# Patient Record
Sex: FEMALE | Race: BLACK OR AFRICAN AMERICAN | Employment: FULL TIME | URBAN - METROPOLITAN AREA
[De-identification: names, ages, dates, MRNs, and addresses within clinical notes are randomized per-mention and may not be internally consistent; named-entity substitution may affect disease eponyms.]

---

## 2017-07-25 ENCOUNTER — HOSPITAL ENCOUNTER (EMERGENCY)
Age: 37
Discharge: HOME | End: 2017-07-25
Attending: EMERGENCY MEDICINE
Payer: COMMERCIAL

## 2017-07-25 VITALS
DIASTOLIC BLOOD PRESSURE: 39 MMHG | HEIGHT: 67 IN | RESPIRATION RATE: 17 BRPM | SYSTOLIC BLOOD PRESSURE: 109 MMHG | OXYGEN SATURATION: 100 % | HEART RATE: 81 BPM | WEIGHT: 175.27 LBS | BODY MASS INDEX: 27.51 KG/M2

## 2017-07-25 DIAGNOSIS — H72.92 RUPTURED TYMPANIC MEMBRANE, LEFT: ICD-10-CM

## 2017-07-25 DIAGNOSIS — H66.92 ACUTE LEFT OTITIS MEDIA: Primary | ICD-10-CM

## 2017-07-25 PROCEDURE — 99282 EMERGENCY DEPT VISIT SF MDM: CPT

## 2017-07-25 RX ORDER — CEFDINIR 300 MG/1
300 CAPSULE ORAL 2 TIMES DAILY
Qty: 20 CAP | Refills: 0 | Status: SHIPPED | OUTPATIENT
Start: 2017-07-25 | End: 2017-08-04

## 2017-07-26 NOTE — DISCHARGE INSTRUCTIONS
Ear Infection (Otitis Media): Care Instructions  Your Care Instructions    An ear infection may start with a cold and affect the middle ear (otitis media). It can hurt a lot. Most ear infections clear up on their own in a couple of days. Most often you will not need antibiotics. This is because many ear infections are caused by a virus. Antibiotics don't work against a virus. Regular doses of pain medicines are the best way to reduce your fever and help you feel better. Follow-up care is a key part of your treatment and safety. Be sure to make and go to all appointments, and call your doctor if you are having problems. It's also a good idea to know your test results and keep a list of the medicines you take. How can you care for yourself at home? · Take pain medicines exactly as directed. ¨ If the doctor gave you a prescription medicine for pain, take it as prescribed. ¨ If you are not taking a prescription pain medicine, take an over-the-counter medicine, such as acetaminophen (Tylenol), ibuprofen (Advil, Motrin), or naproxen (Aleve). Read and follow all instructions on the label. ¨ Do not take two or more pain medicines at the same time unless the doctor told you to. Many pain medicines have acetaminophen, which is Tylenol. Too much acetaminophen (Tylenol) can be harmful. · Plan to take a full dose of pain reliever before bedtime. Getting enough sleep will help you get better. · Try a warm, moist washcloth on the ear. It may help relieve pain. · If your doctor prescribed antibiotics, take them as directed. Do not stop taking them just because you feel better. You need to take the full course of antibiotics. When should you call for help? Call your doctor now or seek immediate medical care if:  · You have new or increasing ear pain. · You have new or increasing pus or blood draining from your ear. · You have a fever with a stiff neck or a severe headache.   Watch closely for changes in your health, and be sure to contact your doctor if:  · You have new or worse symptoms. · You are not getting better after taking an antibiotic for 2 days. Where can you learn more? Go to http://sherrell-don.info/. Enter A173 in the search box to learn more about \"Ear Infection (Otitis Media): Care Instructions. \"  Current as of: May 4, 2017  Content Version: 11.3  © 9284-6191 [employer]. Care instructions adapted under license by [employer] (which disclaims liability or warranty for this information). If you have questions about a medical condition or this instruction, always ask your healthcare professional. [place] [address] any warranty or liability for your use of this information. Perforated Eardrum: Care Instructions  Your Care Instructions    A tear or hole in the membrane of the middle ear is called a perforated or ruptured eardrum. This can happen if an infection builds up inside the ear or if the eardrum gets injured. You may find it hard to hear out of that ear or may hear a buzzing sound. You may have an earache or have fluids that drain from the ear. Your eardrum should heal on its own in a few weeks, and you should hear normally then. If you have an infection, your doctor may prescribe antibiotics. You may need pain relief medicine for your earache. Your doctor will check to see if your eardrum has healed. If not, you may need surgery to repair the eardrum. Follow-up care is a key part of your treatment and safety. Be sure to make and go to all appointments, and call your doctor if you are having problems. It's also a good idea to know your test results and keep a list of the medicines you take. How can you care for yourself at home? · If your doctor prescribed antibiotics, take them as directed. Do not stop taking them just because you feel better. You need to take the full course of antibiotics.   · Take an over-the-counter pain medicine, such as acetaminophen (Tylenol), ibuprofen (Advil, Motrin), or naproxen (Aleve), as needed. Read and follow all instructions on the label. · Do not take two or more pain medicines at the same time unless the doctor told you to. Many pain medicines have acetaminophen, which is Tylenol. Too much acetaminophen (Tylenol) can be harmful. · To ease pain, put a warm washcloth or a heating pad set on low on your ear. You may have some drainage from the ear. · Be careful when taking over-the-counter cold or flu medicines and Tylenol at the same time. Many of these medicines have acetaminophen, which is Tylenol. Read the labels to make sure that you are not taking more than the recommended dose. Too much Tylenol can be harmful. · Keep your ears dry. ¨ Take baths until your doctor says you can take showers again. ¨ When you wash your hair, use cotton lightly coated with petroleum jelly as an earplug. Do not use plastic earplugs. ¨ Do not swim until your doctor says you can. ¨ If you get water in your ears, turn your head to each side and pull the earlobe in different directions. This will help the water run out. If your ears are still wet, use a hair dryer set on the lowest heat. Hold the dryer several inches from your ear. · Do not put anything into your ear canal. For example, do not use a cotton swab to clean the inside of your ear. It can damage your ear. If you think you have something inside your ear, ask your doctor to check it. When should you call for help? Call your doctor now or seek immediate medical care if:  · You have signs of infection, such as:  ¨ Increased pain, swelling, warmth, or redness. ¨ Pus draining from the ear. ¨ A fever. Watch closely for changes in your health, and be sure to contact your doctor if:  · You have changes in hearing. · You do not get better as expected. Where can you learn more? Go to http://renzo.info/.   Enter Z172 in the search box to learn more about \"Perforated Eardrum: Care Instructions. \"  Current as of: March 20, 2017  Content Version: 11.3  © 8704-8912 [employer]. Care instructions adapted under license by [employer] (which disclaims liability or warranty for this information). If you have questions about a medical condition or this instruction, always ask your healthcare professional. [address] any warranty or liability for your use of this information.

## 2017-07-26 NOTE — ED NOTES
MICHAEL instructions and education provided by Marciano Briggs. Pt. Nina [facility] palmer. Pt. Stable at [place].

## 2017-07-26 NOTE — ED PROVIDER NOTES
HPI Comments: Asaf Yee is a 39 y.o. female with PMhx significant for migraines who presents ambulatory with her  to the ED with cc of left sided ear pain which began 10 days ago. Pt states that the pain began after she was at the 's when some water got into her ear. The pain is exacerbated by laying down. Pt is also 28 weeks pregnant (Silverio Mccurdy). She is taking prenatal vitamins and denies any pregnancy complications. She used to take medication for her migraines but stopped after she became pregnant. Pt notes + fetal movement today. Pt specifically denies fevers, congestion, ST, coughing, sinus pressure, vaginal bleeding, LOF, cramping, and abdominal pain. Social Hx: - Tobacco, - EtOH, - Illicit Drugs    PCP: None    OB-GYN: Ashley Jeong MD     There are no other complaints, changes or physical findings at this time. The history is provided by the patient. No  was used. Past Medical History:   Diagnosis Date    Depression     Headache(784.0)     Migraine     Normal vaginal delivery 2000, 2003       No past surgical history on file. Family History:   Problem Relation Age of Onset    High Cholesterol Father     High Cholesterol Maternal Aunt     Other Maternal Grandmother      scleraderma    Cancer Maternal Grandfather     Cancer Paternal Grandmother     Cancer Paternal Grandfather        Social History     Social History    Marital status: SINGLE     Spouse name: N/A    Number of children: N/A    Years of education: N/A     Occupational History    Not on file.      Social History Main Topics    Smoking status: Current Every Day Smoker     Packs/day: 0.50    Smokeless tobacco: Never Used    Alcohol use 1.2 oz/week     2 Glasses of wine per week      Comment: occassionally    Drug use: No    Sexual activity: Yes     Partners: Male     Birth control/ protection: Condom      Comment:      Other Topics Concern    Not on file Social History Narrative         ALLERGIES: Review of patient's allergies indicates no known allergies. Review of Systems   Constitutional: Negative. Negative for chills and fever. HENT: Positive for ear pain (L). Negative for congestion, rhinorrhea, sinus pressure and sore throat.         + jaw pain   Eyes: Negative. Negative for visual disturbance. Respiratory: Negative. Negative for cough, chest tightness, shortness of breath and wheezing. Cardiovascular: Negative. Negative for chest pain and palpitations. Gastrointestinal: Negative. Negative for abdominal pain, constipation, diarrhea, nausea and vomiting. Genitourinary: Negative. Negative for dysuria, hematuria and vaginal bleeding. Musculoskeletal: Negative. Negative for arthralgias and myalgias. Skin: Negative. Negative for rash. Allergic/Immunologic: Negative. Negative for environmental allergies and food allergies. Neurological: Negative. Negative for headaches. Psychiatric/Behavioral: Negative. Negative for suicidal ideas. Vitals:    07/25/17 2213   BP: (!) 109/39   Pulse: 81   Resp: 17   SpO2: 100%   Weight: 79.5 kg (175 lb 4.3 oz)   Height: 5' 7\" (1.702 m)            Physical Exam   Constitutional: She is oriented to person, place, and time. She appears well-developed and well-nourished. No distress. Pt is an AAF, awake and alert in NAD. HENT:   Head: Normocephalic and atraumatic. Right Ear: Tympanic membrane, external ear and ear canal normal.   Left Ear: External ear and ear canal normal. No drainage. Nose: Nose normal.   Mouth/Throat: Uvula is midline, oropharynx is clear and moist and mucous membranes are normal. No oropharyngeal exudate, posterior oropharyngeal edema or posterior oropharyngeal erythema. Small rupture at 10 o'clock of left TM. No drainage noted. Eyes: Conjunctivae and EOM are normal. Pupils are equal, round, and reactive to light. Right eye exhibits no discharge.  Left eye exhibits no discharge. Neck: Normal range of motion. Cardiovascular: Normal rate and normal heart sounds. Pulmonary/Chest: Effort normal and breath sounds normal. No respiratory distress. She has no wheezes. She has no rales. She exhibits no tenderness. Abdominal: Soft. Bowel sounds are normal. She exhibits distension (gravid uterus). There is no tenderness. There is no guarding. No CVA tenderness b/l. Genitourinary:   Genitourinary Comments: Gravid uterus    Musculoskeletal: Normal range of motion. She exhibits no edema or tenderness. Neurological: She is alert and oriented to person, place, and time. Coordination normal.   No focal neuro deficits. Skin: Skin is warm and dry. No rash noted. She is not diaphoretic. No erythema. No pallor. Psychiatric: She has a normal mood and affect. Her behavior is normal.   Vitals reviewed. MDM  Number of Diagnoses or Management Options  Acute left otitis media:   Ruptured tympanic membrane, left:   Diagnosis management comments: DDx: Otitis media, Otitis externa, Ruptured TM       Amount and/or Complexity of Data Reviewed  Review and summarize past medical records: yes    Patient Progress  Patient progress: stable    ED Course       Procedures    MEDICATIONS GIVEN:  Medications - No data to display    IMPRESSION:  1. Acute left otitis media    2. Ruptured tympanic membrane, left        PLAN:  1. Current Discharge Medication List      START taking these medications    Details   cefdinir (OMNICEF) 300 mg capsule Take 1 Cap by mouth two (2) times a day for 10 days. Qty: 20 Cap, Refills: 0           2.    Follow-up Information     Follow up With Details Comments Contact Info    Jeanie Lainez MD Schedule an appointment as soon as possible for a visit in 2 days  [address] [facility]      Tran Borrego MD Schedule an appointment as soon as possible for a visit in 4 days  [address] [place] [address]  [phone]      \[facility] [place] [facility]\"" EMERGENCY DEPT  As needed or, If symptoms worsen [address] [facility]  [phone]        Return to ED if worse     Discharge Note:  10:57 PM  The patient has been re-evaluated and is ready for discharge. Reviewed available results with patient. Counseled patient on diagnosis and care plan. Patient has expressed understanding, and all questions have been answered. Patient agrees with plan and agrees to follow up as recommended, or return to the ED if their symptoms worsen. Discharge instructions have been provided and explained to the patient, along with reasons to return to the ED. Attestation: This note is prepared by Clare Agrawal, acting as Scribe for FELICIA Gonsalez PA-C: The scribe's documentation has been prepared under my direction and personally reviewed by me in its entirety. I confirm that the note above accurately reflects all work, treatment, procedures, and medical decision making performed by me. This note will not be viewable in [address]

## 2018-11-28 ENCOUNTER — TELEPHONE (OUTPATIENT)
Dept: NEUROLOGY | Age: 38
End: 2018-11-28

## 2018-11-28 NOTE — TELEPHONE ENCOUNTER
Pt has been to [facility] twice in a week for her migraines. She is scheduled for a f/u on 1/4 but would like to know if she can be seen sooner.  Please call back

## 2018-11-28 NOTE — TELEPHONE ENCOUNTER
Spoke with patient that there is no opening at this time but she is welcome to call and see if there has been any cancellations. She verbalized understanding.

## 2021-11-22 ENCOUNTER — HOSPITAL ENCOUNTER (EMERGENCY)
Age: 41
Discharge: HOME | End: 2021-11-22
Attending: EMERGENCY MEDICINE
Payer: COMMERCIAL

## 2021-11-22 ENCOUNTER — APPOINTMENT (OUTPATIENT)
Dept: GENERAL RADIOLOGY | Age: 41
End: 2021-11-22
Attending: EMERGENCY MEDICINE
Payer: COMMERCIAL

## 2021-11-22 VITALS
TEMPERATURE: 98.7 F | HEIGHT: 67 IN | HEART RATE: 82 BPM | WEIGHT: 139 LBS | DIASTOLIC BLOOD PRESSURE: 73 MMHG | SYSTOLIC BLOOD PRESSURE: 119 MMHG | BODY MASS INDEX: 21.82 KG/M2 | OXYGEN SATURATION: 98 % | RESPIRATION RATE: 19 BRPM

## 2021-11-22 DIAGNOSIS — S93.411A SPRAIN OF CALCANEOFIBULAR LIGAMENT OF RIGHT ANKLE, INITIAL ENCOUNTER: Primary | ICD-10-CM

## 2021-11-22 DIAGNOSIS — Z72.0 TOBACCO ABUSE: ICD-10-CM

## 2021-11-22 PROCEDURE — 74011250637 HC RX REV CODE- 250/637: Performed by: PHYSICIAN ASSISTANT

## 2021-11-22 PROCEDURE — 99283 EMERGENCY DEPT VISIT LOW MDM: CPT

## 2021-11-22 PROCEDURE — 73610 X-RAY EXAM OF ANKLE: CPT

## 2021-11-22 RX ORDER — ACETAMINOPHEN 500 MG
1000 TABLET ORAL
Status: COMPLETED | OUTPATIENT
Start: 2021-11-22 | End: 2021-11-22

## 2021-11-22 RX ORDER — ACETAMINOPHEN 500 MG
1000 TABLET ORAL
Qty: 20 TABLET | Refills: 0 | Status: SHIPPED | OUTPATIENT
Start: 2021-11-22

## 2021-11-22 RX ORDER — IBUPROFEN 800 MG/1
800 TABLET ORAL
Qty: 20 TABLET | Refills: 0 | Status: SHIPPED | OUTPATIENT
Start: 2021-11-22 | End: 2021-11-29

## 2021-11-22 RX ADMIN — ACETAMINOPHEN 1000 MG: 500 TABLET ORAL at 11:35

## 2021-11-22 NOTE — ED NOTES
Discharge instructions were given to the patient by Adrian Norris RN. The patient left the Emergency Department ambulatory, alert and oriented and in no acute distress with 2 prescriptions. The patient was encouraged to call or return to the ED for worsening issues or problems and was encouraged to schedule a follow up appointment for continuing care. The patient verbalized understanding of discharge instructions and prescriptions, all questions were answered. The patient has no further concerns at this time.

## 2021-11-22 NOTE — ED PROVIDER NOTES
EMERGENCY DEPARTMENT HISTORY AND PHYSICAL EXAM      Date: 11/22/2021  Patient Name: Jeffie Hodgkin    History of Presenting Illness     Chief Complaint   Patient presents with    Ankle Injury     History Provided By: Patient    HPI: Jeffie Hodgkin, 36 y.o. female with medical history significant for tobacco abuse who presents via self to the ED with cc of acute moderate aching right lateral ankle pain with swelling and bruising X 2 days secondary to ground-level fall while drinking alcohol yesterday. Denies any other injuries or trauma. States that she was wearing high heels. No medications or alleviating factors. No fever, chills, nausea, vomiting, focal weakness. She does endorse some numbness to the area. PCP: None    There are no other complaints, changes, or physical findings at this time. No current facility-administered medications on file prior to encounter. Current Outpatient Medications on File Prior to Encounter   Medication Sig Dispense Refill    ZOLMitriptan (ZOMIG) 5 mg tablet Take 1 Tab by mouth as needed for Migraine. 9 Tab 1    riboflavin, vitamin B2, 100 mg tablet Take 1 Tab by mouth daily. 30 Tab 1    amitriptyline (ELAVIL) 25 mg tablet Take 1 Tab by mouth nightly. 30 Tab 1    diclofenac potassium (CATAFLAM) 50 mg tablet Take 1 Tab by mouth three (3) times daily as needed for Pain. Indications: MIGRAINE 50 Tab 6    lamoTRIgine (LAMICTAL XR) 50 mg tr24 ER tablet Take 1 Tab by mouth daily. 30 Tab 6     Past History     Past Medical History:  Past Medical History:   Diagnosis Date    Depression     Headache(784.0)     Migraine     Normal vaginal delivery 2000, 2003     Past Surgical History:  No past surgical history on file.   Family History:  Family History   Problem Relation Age of Onset    High Cholesterol Father     High Cholesterol Maternal Aunt     Other Maternal Grandmother         scleraderma    Cancer Maternal Grandfather     Cancer Paternal Grandmother  Cancer Paternal Grandfather      Social History:  Social History     Tobacco Use    Smoking status: Current Every Day Smoker     Packs/day: 0.50    Smokeless tobacco: Never Used   Substance Use Topics    Alcohol use: Yes     Alcohol/week: 2.0 standard drinks     Types: 2 Glasses of wine per week     Comment: occassionally    Drug use: No     Allergies:  No Known Allergies  Review of Systems   Review of Systems   Constitutional: Negative for activity change, appetite change, chills, diaphoresis, fatigue and fever. HENT: Negative. Eyes: Negative. Negative for pain and visual disturbance. Respiratory: Negative. Negative for cough and shortness of breath. Cardiovascular: Negative. Negative for chest pain and leg swelling. Gastrointestinal: Negative. Negative for abdominal pain, diarrhea, nausea and vomiting. Genitourinary: Negative. Musculoskeletal: Positive for arthralgias, gait problem and joint swelling. Negative for back pain, myalgias, neck pain and neck stiffness. Skin: Negative. Negative for color change, pallor and wound. Neurological: Negative for weakness and numbness. Psychiatric/Behavioral: Negative. Negative for confusion. Physical Exam   Physical Exam  Vitals and nursing note reviewed. Constitutional:       General: She is not in acute distress. Appearance: Normal appearance. She is well-developed. She is not ill-appearing, toxic-appearing or diaphoretic. HENT:      Head: Normocephalic and atraumatic. Right Ear: Hearing and external ear normal.      Left Ear: Hearing and external ear normal.      Nose: Nose normal.   Eyes:      Conjunctiva/sclera: Conjunctivae normal.      Pupils: Pupils are equal, round, and reactive to light. Cardiovascular:      Pulses:           Dorsalis pedis pulses are 2+ on the right side and 2+ on the left side. Posterior tibial pulses are 2+ on the right side and 2+ on the left side.    Pulmonary:      Effort: Pulmonary effort is normal. No respiratory distress. Musculoskeletal:         General: Normal range of motion. Cervical back: Normal range of motion. Right knee: Normal.      Right lower leg: Normal.      Left lower leg: Normal.      Right ankle: Swelling and ecchymosis present. No deformity or lacerations. Tenderness present over the lateral malleolus, ATF ligament, CF ligament and base of 5th metatarsal. No medial malleolus tenderness. Normal range of motion. Anterior drawer test negative. Normal pulse. Right Achilles Tendon: Normal. No tenderness or defects. Rachel's test negative. Left ankle: Normal.      Right foot: Normal range of motion and normal capillary refill. Swelling, tenderness and bony tenderness present. No deformity, bunion, Charcot foot, foot drop, prominent metatarsal heads, laceration or crepitus. Normal pulse. Left foot: Normal.   Skin:     General: Skin is warm and dry. Capillary Refill: Capillary refill takes less than 2 seconds. Findings: Bruising present. No abrasion, erythema, laceration or wound. Neurological:      Mental Status: She is alert and oriented to person, place, and time. Psychiatric:         Behavior: Behavior normal.         Thought Content: Thought content normal.         Judgment: Judgment normal.       Diagnostic Study Results   Labs -   No results found for this or any previous visit (from the past 12 hour(s)). Radiologic Studies -   XR ANKLE RT MIN 3 V   Final Result   No evidence of acute traumatic injury involving the right ankle. XR ANKLE RT MIN 3 V    Result Date: 11/22/2021  No evidence of acute traumatic injury involving the right ankle. Medical Decision Making   I am the first provider for this patient. I reviewed the vital signs, available nursing notes, past medical history, past surgical history, family history and social history. Vital Signs-Reviewed the patient's vital signs.   Patient Vitals for the past 24 hrs:   Temp Pulse Resp BP SpO2   11/22/21 1123 98.7 °F (37.1 °C) 82 19 119/73 98 %     Pulse Oximetry Analysis - 98% on RA (normal)    Records Reviewed: Nursing Notes, Old Medical Records, Previous Radiology Studies and Previous Laboratory Studies    Provider Notes (Medical Decision Making):   Patient presents with ankle pain after trauma. DDX: sprain, fracture, contusion. Will get analgesics and xray to further evaluate. ED Course:   Initial assessment performed. The patients presenting problems have been discussed, and they are in agreement with the care plan formulated and outlined with them. I have encouraged them to ask questions as they arise throughout their visit. TOBACCO COUNSELING:  Upon evaluation, pt expressed that they are a current tobacco user. Pt has been counseled on the dangers of smoking and was encouraged to quit as soon as possible in order to decrease further risks to their health. Pt has conveyed their understanding of the risks involved should they continue to use tobacco products. 4 min discussion. Progress Note:   Updated pt on all returned results and findings. Discussed the importance of proper follow up as referred below along with return precautions. Pt in agreement with the care plan and expresses agreement with and understanding of all items discussed. Disposition:  12:00 PM  I have discussed with patient their diagnosis, treatment, and follow up plan. The patient agrees to follow up as outlined in discharge paperwork and also to return to the ED with any worsening. Prashanth Shay PA-C      PLAN:  1. Current Discharge Medication List      START taking these medications    Details   ibuprofen (MOTRIN) 800 mg tablet Take 1 Tablet by mouth every six (6) hours as needed for Pain for up to 7 days.   Qty: 20 Tablet, Refills: 0  Start date: 11/22/2021, End date: 11/29/2021      acetaminophen (TYLENOL) 500 mg tablet Take 2 Tablets by mouth every six (6) hours as needed for Pain. Qty: 20 Tablet, Refills: 0  Start date: 11/22/2021           2. Follow-up Information     Follow up With Specialties Details Why Contact Info    [facility]  Schedule an appointment as soon as possible for a visit in 1 week As needed [address] [facility] [address] [facility] [id number] [place] [address] EMERGENCY DEPT Emergency Medicine Go to  As needed, If symptoms worsen [address] [facility]  [phone]        Return to ED if worse     Diagnosis     Clinical Impression:   1. Sprain of calcaneofibular ligament of right ankle, initial encounter    2. Tobacco abuse            Please note that this dictation was completed with Dragon, computer voice recognition software. Quite often unanticipated grammatical, syntax, homophones, and other interpretive errors are inadvertently transcribed by the computer software. Please disregard these errors. Additionally, please excuse any errors that have escaped final proofreading.

## 2021-11-22 NOTE — DISCHARGE INSTRUCTIONS
It was a pleasure taking care of you at [facility] Emergency Department today. We know that when you come to [facility], you are entrusting us with your health, comfort, and safety. Our physicians and nurses honor that trust, and we truly appreciate the opportunity to care for you and your loved ones. We also value our feedback. If you receive a survey about your Emergency Department experience today, please fill it out. We care about our patients' feedback, and we listen to what you have to say. Thank you!

## 2021-11-22 NOTE — ED NOTES
Pt presents to ED ambulatory complaining of right ankle pain x yesterday. Pt reports she was wearing heels when she tripped and fell injuring her. Pt is alert and oriented x 4, RR even and unlabored, skin is warm and dry. Assessment completed and pt updated on plan of care. Call bell in reach. Emergency Department Nursing Plan of Care       The Nursing Plan of Care is developed from the Nursing assessment and Emergency Department Attending provider initial evaluation. The plan of care may be reviewed in the ED Provider note.     The Plan of Care was developed with the following considerations:   Patient / Family readiness to learn indicated by:verbalized understanding  Persons(s) to be included in education: patient  Barriers to Learning/Limitations:No    Signed     Omid Reynolds RN    11/22/2021   11:40 AM
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
## 2021-11-22 NOTE — Clinical Note
[facility] EMERGENCY DEPT  [address] [id number] [phone]    Work/School Note    Date: 11/22/2021    To Whom It May concern:    Torsten Booth was seen and treated today in the emergency room by the following provider(s):  Attending Provider: Marty Win MD  Physician Assistant: Wilma Jacobo PA-C. Torsten Booth is excused from work/school on 11/22/21 and 11/23/21. She is medically clear to return to work/school on 11/24/2021.        Sincerely,          Ree Judge PA-C

## 2023-11-30 ENCOUNTER — APPOINTMENT (OUTPATIENT)
Facility: HOSPITAL | Age: 43
End: 2023-11-30
Payer: COMMERCIAL

## 2023-11-30 ENCOUNTER — HOSPITAL ENCOUNTER (EMERGENCY)
Facility: HOSPITAL | Age: 43
Discharge: HOME | End: 2023-11-30
Payer: COMMERCIAL

## 2023-11-30 VITALS
WEIGHT: 143.74 LBS | SYSTOLIC BLOOD PRESSURE: 114 MMHG | HEART RATE: 83 BPM | TEMPERATURE: 98.1 F | BODY MASS INDEX: 22.51 KG/M2 | RESPIRATION RATE: 16 BRPM | DIASTOLIC BLOOD PRESSURE: 80 MMHG | OXYGEN SATURATION: 99 %

## 2023-11-30 DIAGNOSIS — M54.6 ACUTE BILATERAL THORACIC BACK PAIN: Primary | ICD-10-CM

## 2023-11-30 DIAGNOSIS — M54.50 ACUTE BILATERAL LOW BACK PAIN WITHOUT SCIATICA: ICD-10-CM
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
## 2023-11-30 PROCEDURE — 6370000000 HC RX 637 (ALT 250 FOR IP): Performed by: PHYSICIAN ASSISTANT

## 2023-11-30 PROCEDURE — 99283 EMERGENCY DEPT VISIT LOW MDM: CPT

## 2023-11-30 PROCEDURE — 72100 X-RAY EXAM L-S SPINE 2/3 VWS: CPT

## 2023-11-30 PROCEDURE — 72070 X-RAY EXAM THORAC SPINE 2VWS: CPT

## 2023-11-30 RX ORDER — IBUPROFEN 600 MG/1
600 TABLET ORAL
Status: COMPLETED | OUTPATIENT
Start: 2023-11-30 | End: 2023-11-30

## 2023-11-30 RX ORDER — OXYCODONE HYDROCHLORIDE 5 MG/1
5 TABLET ORAL
Status: COMPLETED | OUTPATIENT
Start: 2023-11-30 | End: 2023-11-30

## 2023-11-30 RX ORDER — HYDROCODONE BITARTRATE AND ACETAMINOPHEN 5; 325 MG/1; MG/1
1 TABLET ORAL EVERY 8 HOURS PRN
Qty: 9 TABLET | Refills: 0 | Status: SHIPPED | OUTPATIENT
Start: 2023-11-30 | End: 2023-12-03

## 2023-11-30 RX ORDER — CYCLOBENZAPRINE HCL 10 MG
10 TABLET ORAL 3 TIMES DAILY PRN
Qty: 21 TABLET | Refills: 0 | Status: SHIPPED | OUTPATIENT
Start: 2023-11-30 | End: 2023-12-10

## 2023-11-30 RX ORDER — IBUPROFEN 600 MG/1
600 TABLET ORAL EVERY 8 HOURS PRN
Qty: 20 TABLET | Refills: 0 | Status: SHIPPED | OUTPATIENT
Start: 2023-11-30

## 2023-11-30 RX ADMIN — OXYCODONE 5 MG: 5 TABLET ORAL at 13:00

## 2023-11-30 RX ADMIN — IBUPROFEN 600 MG: 600 TABLET, FILM COATED ORAL at 13:00

## 2023-11-30 ASSESSMENT — VISUAL ACUITY: OU: 1

## 2023-11-30 ASSESSMENT — ENCOUNTER SYMPTOMS
ABDOMINAL PAIN: 0
BACK PAIN: 1

## 2023-11-30 ASSESSMENT — PAIN SCALES - GENERAL: PAINLEVEL_OUTOF10: 10
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
## 2023-11-30 NOTE — ED NOTES
Patient stable [facility]  Discharged summary explained and understood      Margaux Collazo RN  11/30/23 [id number]

## 2023-12-24 ENCOUNTER — APPOINTMENT (OUTPATIENT)
Facility: HOSPITAL | Age: 43
End: 2023-12-24

## 2023-12-24 ENCOUNTER — HOSPITAL ENCOUNTER (EMERGENCY)
Facility: HOSPITAL | Age: 43
Discharge: HOME | End: 2023-12-24
Attending: EMERGENCY MEDICINE

## 2023-12-24 VITALS
HEART RATE: 90 BPM | BODY MASS INDEX: 21.74 KG/M2 | WEIGHT: 138.5 LBS | DIASTOLIC BLOOD PRESSURE: 61 MMHG | TEMPERATURE: 99.5 F | SYSTOLIC BLOOD PRESSURE: 113 MMHG | RESPIRATION RATE: 18 BRPM | HEIGHT: 67 IN | OXYGEN SATURATION: 100 %

## 2023-12-24 DIAGNOSIS — J22 ACUTE RESPIRATORY INFECTION: Primary | ICD-10-CM

## 2023-12-24 DIAGNOSIS — G44.209 ACUTE NON INTRACTABLE TENSION-TYPE HEADACHE: ICD-10-CM

## 2023-12-24 DIAGNOSIS — Z72.0 TOBACCO ABUSE: ICD-10-CM

## 2023-12-24 DIAGNOSIS — J98.01 ACUTE BRONCHOSPASM: ICD-10-CM

## 2023-12-24 LAB
FLUAV RNA SPEC QL NAA+PROBE: NOT DETECTED
FLUBV RNA SPEC QL NAA+PROBE: NOT DETECTED
SARS-COV-2 RNA RESP QL NAA+PROBE: NOT DETECTED

## 2023-12-24 PROCEDURE — 6370000000 HC RX 637 (ALT 250 FOR IP): Performed by: EMERGENCY MEDICINE

## 2023-12-24 PROCEDURE — 71045 X-RAY EXAM CHEST 1 VIEW: CPT

## 2023-12-24 PROCEDURE — 87636 SARSCOV2 & INF A&B AMP PRB: CPT

## 2023-12-24 PROCEDURE — 99284 EMERGENCY DEPT VISIT MOD MDM: CPT

## 2023-12-24 PROCEDURE — 94640 AIRWAY INHALATION TREATMENT: CPT

## 2023-12-24 RX ORDER — ALBUTEROL SULFATE 90 UG/1
2 AEROSOL, METERED RESPIRATORY (INHALATION) ONCE
Status: COMPLETED | OUTPATIENT
Start: 2023-12-24 | End: 2023-12-24

## 2023-12-24 RX ORDER — GUAIFENESIN 600 MG/1
600 TABLET, EXTENDED RELEASE ORAL
Status: COMPLETED | OUTPATIENT
Start: 2023-12-24 | End: 2023-12-24

## 2023-12-24 RX ORDER — GUAIFENESIN 600 MG/1
600 TABLET, EXTENDED RELEASE ORAL 2 TIMES DAILY
Qty: 30 TABLET | Refills: 0 | Status: SHIPPED | OUTPATIENT
Start: 2023-12-24 | End: 2024-01-08

## 2023-12-24 RX ORDER — BUTALBITAL, ACETAMINOPHEN AND CAFFEINE 50; 325; 40 MG/1; MG/1; MG/1
1 TABLET ORAL
Status: COMPLETED | OUTPATIENT
Start: 2023-12-24 | End: 2023-12-24

## 2023-12-24 RX ORDER — PREDNISONE 20 MG/1
60 TABLET ORAL
Status: COMPLETED | OUTPATIENT
Start: 2023-12-24 | End: 2023-12-24

## 2023-12-24 RX ORDER — PREDNISONE 50 MG/1
50 TABLET ORAL DAILY
Qty: 5 TABLET | Refills: 0 | Status: SHIPPED | OUTPATIENT
Start: 2023-12-24 | End: 2023-12-29

## 2023-12-24 RX ORDER — BUTALBITAL, ACETAMINOPHEN AND CAFFEINE 50; 325; 40 MG/1; MG/1; MG/1
1 TABLET ORAL EVERY 4 HOURS PRN
Qty: 30 TABLET | Refills: 0 | Status: SHIPPED | OUTPATIENT
Start: 2023-12-24

## 2023-12-24 RX ORDER — AZITHROMYCIN 250 MG/1
250 TABLET, FILM COATED ORAL SEE ADMIN INSTRUCTIONS
Qty: 6 TABLET | Refills: 0 | Status: SHIPPED | OUTPATIENT
Start: 2023-12-24 | End: 2023-12-29

## 2023-12-24 RX ORDER — IPRATROPIUM BROMIDE AND ALBUTEROL SULFATE 2.5; .5 MG/3ML; MG/3ML
1 SOLUTION RESPIRATORY (INHALATION)
Status: COMPLETED | OUTPATIENT
Start: 2023-12-24 | End: 2023-12-24

## 2023-12-24 RX ADMIN — GUAIFENESIN 600 MG: 600 TABLET ORAL at 20:38

## 2023-12-24 RX ADMIN — PREDNISONE 60 MG: 20 TABLET ORAL at 22:07

## 2023-12-24 RX ADMIN — IPRATROPIUM BROMIDE AND ALBUTEROL SULFATE 1 DOSE: .5; 3 SOLUTION RESPIRATORY (INHALATION) at 21:55

## 2023-12-24 RX ADMIN — BUTALBITAL, ACETAMINOPHEN, AND CAFFEINE 1 TABLET: 50; 325; 40 TABLET ORAL at 20:38

## 2023-12-24 RX ADMIN — ALBUTEROL SULFATE 2 PUFF: 90 AEROSOL, METERED RESPIRATORY (INHALATION) at 20:37

## 2023-12-25 NOTE — DISCHARGE INSTRUCTIONS
Thank You! It was a pleasure taking care of you in our Emergency Department today. We know that when you come to [facility], you are entrusting us with your health, comfort, and safety. Our physicians and nurses honor that trust, and truly appreciate the opportunity to care for you and your loved ones. We also value your feedback. If you receive a survey about your Emergency Department experience today, please fill it out. We care about our patients' feedback, and we listen to what you have to say. Thank you. Dr. Liu Salazar M.D.      ____________________________________________________________________  I have included a copy of your lab results and/or radiologic studies from today's visit so you can have them easily available at your follow-up visit. We hope you feel better and please do not hesitate to contact the ED if you have any questions at all! Recent Results (from the past 12 hour(s))   COVID-19 & Influenza Combo    Collection Time: 12/24/23  8:42 PM    Specimen: Nasopharyngeal   Result Value Ref Range    SARS-CoV-2, PCR Not detected NOTD      Rapid Influenza A By PCR Not detected      Rapid Influenza B By PCR Not detected         XR CHEST PORTABLE   Final Result      No acute process on portable chest.           [unfilled]  The exam and treatment you received in the Emergency Department were for an urgent problem and are not intended as complete care. It is important that you follow up with a doctor, nurse practitioner, or physician assistant for ongoing care. If your symptoms become worse or you do not improve as expected and you are unable to reach your usual health care provider, you should return to the Emergency Department. We are available 24 hours a day. Please take your discharge instructions with you when you go to your follow-up appointment.      If a prescription has been provided, please have it filled as soon as possible to prevent a delay in

## 2023-12-25 NOTE — ED NOTES
Discharge instructions were given to the patient by [facility]. The patient left the Emergency Department ambulatory, alert and oriented and in no acute distress with 4 prescriptions. The patient was encouraged to call or return to the ED for worsening issues or problems and was encouraged to schedule a follow up appointment for continuing care. The patient verbalized understanding of discharge instructions and prescriptions, all questions were answered. The patient has no further concerns at this time.

## 2023-12-25 NOTE — ED TRIAGE NOTES
Pt states she was seen at  8 days ago and tested positive for the flu.  Pt states that she is no better still has cough, sore throat, fever and runny nose

## 2023-12-25 NOTE — ED PROVIDER NOTES
EMERGENCY DEPARTMENT HISTORY AND PHYSICAL EXAM            Please note that this dictation was completed with the assistance of \"Dragon\", the computer voice recognition software. Quite often unanticipated grammatical, syntax, homophones, and other interpretive errors are inadvertently transcribed by the computer software. Please disregard these errors and any errors that have escaped final proofreading. Thank you. Date of Evaluation: 12/24/23  Patient: Gibson Kuo  Patient Age and Sex: 43 y.o. female   MRN: [id number]  CSN: [id number]  PCP: No primary care provider on file. History of Present Illness     Chief Complaint   Patient presents with    Illness     History Provided By: Patient/family/EMS (if available)    History is limited by: Nothing     HPI: Gibson Kuo, 43 y.o. female with past medical history as documented below presents to the ED with c/o of 1 week history of mild to moderate dry cough, sore throat, nasal congestion and bitemporal headaches. Patient states [facility] and was tested negative for flu. Patient reports taking Tamiflu though prescribed. Patient reports ongoing headache, cough and congestion and generalized bodyaches. Denies any fevers at home. Denies any pleuritic chest pain, recent prolonged travel, history of DVT or PE. Taras Zelaya Pt denies any other exacerbating or ameliorating factors. There are no other complaints, changes or physical findings pertinent to the HPI at this time. Nursing notes were all reviewed and agreed with or any disagreements were addressed in the HPI. Past History   Past Medical History:  Past Medical History:   Diagnosis Date    Depression     Headache(784.0)     Migraine     Normal vaginal delivery 2000, 2003       Past Surgical History:  History reviewed. No pertinent surgical history.     Family History:   Family history reviewed and was non-contributory, unless specified below:  Family History